# Patient Record
Sex: FEMALE | Race: WHITE | NOT HISPANIC OR LATINO | Employment: FULL TIME | URBAN - METROPOLITAN AREA
[De-identification: names, ages, dates, MRNs, and addresses within clinical notes are randomized per-mention and may not be internally consistent; named-entity substitution may affect disease eponyms.]

---

## 2020-07-01 ENCOUNTER — HOSPITAL ENCOUNTER (OUTPATIENT)
Dept: OTHER | Facility: HOSPITAL | Age: 50
Discharge: HOME | End: 2020-07-01
Attending: SPECIALIST

## 2020-07-20 ENCOUNTER — OFFICE VISIT CONVERTED (OUTPATIENT)
Dept: SURGERY | Facility: CLINIC | Age: 50
End: 2020-07-20
Attending: SURGERY

## 2020-07-23 ENCOUNTER — HOSPITAL ENCOUNTER (OUTPATIENT)
Dept: MRI IMAGING | Facility: HOSPITAL | Age: 50
Discharge: HOME | End: 2020-07-23
Attending: SURGERY

## 2020-08-13 ENCOUNTER — HOSPITAL ENCOUNTER (OUTPATIENT)
Dept: ULTRASOUND IMAGING | Facility: HOSPITAL | Age: 50
Discharge: HOME | End: 2020-08-13
Attending: SURGERY

## 2020-08-24 ENCOUNTER — OFFICE VISIT CONVERTED (OUTPATIENT)
Dept: SURGERY | Facility: CLINIC | Age: 50
End: 2020-08-24
Attending: SURGERY

## 2020-11-16 ENCOUNTER — HOSPITAL ENCOUNTER (OUTPATIENT)
Dept: PREADMISSION TESTING | Facility: HOSPITAL | Age: 50
Discharge: HOME | End: 2020-11-16
Attending: SURGERY

## 2020-11-19 LAB — SARS-COV-2 RNA SPEC QL NAA+PROBE: NOT DETECTED

## 2020-11-20 ENCOUNTER — HOSPITAL ENCOUNTER (OUTPATIENT)
Dept: PERIOP | Facility: HOSPITAL | Age: 50
Setting detail: HOSPITAL OUTPATIENT SURGERY
Discharge: HOME | End: 2020-11-20
Attending: SURGERY

## 2020-12-02 ENCOUNTER — OFFICE VISIT CONVERTED (OUTPATIENT)
Dept: SURGERY | Facility: CLINIC | Age: 50
End: 2020-12-02
Attending: SURGERY

## 2021-05-10 NOTE — H&P
History and Physical      Patient Name: Monisha Mendoza   Patient ID: [id number]   Sex: Female   YOB: 1970    Primary Care Provider: Sage PATEL   Referring Provider: Kristopher Reyes MD    Visit Date: December 2, 2020    Provider: Marija Pickett MD   Location: [facility]   Location Address: [address] [place] [address]  [id number]   Location Phone: [phone]          Chief Complaint  · Follow Up Surgery      History Of Present Illness  Monisha Mendoza is a 50 year old female who is here today for follow up of: excision of right breast lesions retroareolar location.   She is doing well-status post surgery. Pathology was likely papilloma but could not rule out papillary dcis. Will present at breast tumor board deborah.       Past Medical History  Allergic rhinitis, chronic; Anemia, Unspecified; Nipple discharge         Past Surgical History  *Denies any surgical procedures         Allergy List  NO KNOWN DRUG ALLERGIES       Allergies Reconciled  Family Medical History  *No Known Family History         Social History  Tobacco (Never)         Review of Systems  · Constitutional  o Denies  o : fever, chills  · Eyes  o Denies  o : yellowish discoloration of the eyes, eye pain  · HENT  o Denies  o : difficulty swallowing, hoarseness  · Breasts  o * See HPI  · Cardiovascular  o Denies  o : chest pain on exertion, irregular heart beats  · Respiratory  o Denies  o : shortness of breath, cough  · Gastrointestinal  o Denies  o : nausea, vomiting, diarrhea, constipation  · Integument  o Admits  o : see HPI for surgical incision healing  · Neurologic  o Denies  o : tingling or numbness, loss of balance  · Musculoskeletal  o Denies  o : joint pain, back pain  · Endocrine  o Denies  o : weight gain, weight loss  · All Others Negative      Vitals  Date Time BP Position Site L\R Cuff Size HR RR TEMP (F) WT  HT  BMI kg/m2 BSA m2 O2 Sat FR L/min FiO2 HC       12/02/2020 09:19 AM   "    87 - R 16   5'  5\"               Physical Examination  · Constitutional  o Appearance  o : well developed/well nourished patient in no apparent distress  · Respiratory  o Inspection of Chest  o : equal breaths bilaterally  · Gastrointestinal  o Abdominal Examination  o : soft/nontender, nondistended, no organomegaly appreciated  · Post Surgical Incision  o Surgical wound  o : healing well, no erythema          Assessment  · Postoperative Exam Following Surgery     V67.00/Z09      Plan  · Medications  o Medications have been Reconciled  o Transition of Care or Provider Policy  · Instructions  o Return to office with any issues.  o F/U PRN  o Will call patient back after tumor board discussion deborah, at least will need increase high risk screening            Electronically Signed by: Marija Pickett MD -Author on December 2, 2020 09:31:54 AM  "

## 2021-05-10 NOTE — H&P
"   History and Physical      Patient Name: Monisha Mendoza   Patient ID: [id number]   Sex: Female   YOB: 1970    Primary Care Provider: Sage PATEL   Referring Provider: Kristopher Reyes MD    Visit Date: August 24, 2020    Provider: Marija Pickett MD   Location: [facility]   Location Address: [address] [place] [address]  [id number]   Location Phone: [phone]          Chief Complaint  · Right bloody nipple discharge and retroareolar lesions  · Pre-Surgical History and Physical Examination for [facility]  · Consents for Surgery      History Of Present Illness  Monisha Mendoza is a 50 year old female who is referred from Kristopher Reyes MD ; she had bloody nipple discharge and she had an MRI that showed some retroareolar lesions at 3 and 9 oclock a second look ultrasound confirmed these but they do not feel they can biopsy it or localize it and recommended retroareolar surgical excision.           Past Medical History  Allergic rhinitis, chronic; Anemia, Unspecified; Nipple discharge         Past Surgical History  *Denies any surgical procedures         Allergy List  NO KNOWN DRUG ALLERGIES       Allergies Reconciled  Family Medical History  *No Known Family History         Social History  Tobacco (Never)         Review of Systems  · Constitutional  o Denies  o : fever, chills  · Breasts  o * See HPI  · Cardiovascular  o Denies  o : chest pain, cardiac murmurs, irregular heart beats, dyspnea on exertion  · Integument  o Denies  o : rash, itching, new skin lesions  · Heme-Lymph  o Denies  o : easy bleeding, easy bruising, lymph node enlargement or tenderness      Vitals  Date Time BP Position Site L\R Cuff Size HR RR TEMP (F) WT  HT  BMI kg/m2 BSA m2 O2 Sat HC       08/24/2020 08:32 AM      86 - R 16   5'  5\"             Physical Examination  · Constitutional  o Appearance  o : A well-nourished, well-developed patient who ambulates without difficulty, Alert " and Oriented X3.  · Respiratory  o Respiratory Effort  o : breathing unlabored  o Inspection of Chest  o : breaths equal bilaterally  · Breasts  o Inspection of Breasts  o : developmental state normal for age  o Palpation of Breasts, Axillae  o : 3 oclock and 9 oclock at palpable masses located immediately behind the nipple and areola          Assessment  · Breast Mass     611.72/N63  · Preoperative Examination     V72.83      Plan  · Orders  o General Surgery Order (GENOR) - - 08/24/2020  o General Surgery Order (GENOR) - 611.72/N63 - 11/20/2020  o [facility] Pre-Op Covid-19 Screening (92857) - 611.72/N63 - 11/16/2020  · Medications  o Medications have been Reconciled  o Transition of Care or Provider Policy  · Instructions  o PLAN:  o Schedule operation  o Handouts Provided-Pre-Procedure Instructions including date and time and location of procedure.  o ****Surgical Orders****  o ****Patient Status****  o RISK AND BENEFITS:  o Consent for surgery: Given these options, the patient has verbally expressed an understanding of the risks of surgery and finds these risks acceptable. We will proceed with surgery as soon as possible.  o Possible risks, complications, benefits, and alternatives to surgical or invasive procedure have been explained to patient and/or legal guardian.  o O.R. PREP: Per protocol  o IV: Per Anesthesia  o Please sign permit for: Excision of right retroareolar masses x 2  o Kefzol 2 grams IV on call to OR.  o The above History and Physical Examination has been completed within 30 days of admission.            Electronically Signed by: Marija Pickett MD -Author on August 24, 2020 09:15:24 AM

## 2021-05-10 NOTE — H&P
"   History and Physical      Patient Name: Monisha Mendoza   Patient ID: [id number]   Sex: Female   YOB: 1970        Visit Date: July 20, 2020    Provider: Marija Pickett MD   Location: [facility]   Location Address: [address] [place] [address]  [id number]   Location Phone: [phone]          Chief Complaint  · Discharge from multiple ducts on right breast, clear and bloody      History Of Present Illness  Monisha Mendoza is a 50 year old female who is referred from Kristopher Reyes MD ; Very pleasant lady with discharge from multiple ducts on right nipple, one with bloody discharge and several with clear, there was one also with a more physiological color. She had a mammogram that showed duct ectasaia in right breast and ductal debris. A ductogram would not be appropriate given number of ducts involved.             Past Medical History  Allergic rhinitis, chronic; Anemia, Unspecified; Nipple discharge         Past Surgical History  *Denies any surgical procedures         Allergy List  NO KNOWN DRUG ALLERGIES       Allergies Reconciled  Family Medical History  *No Known Family History         Social History  Tobacco (Never)         Review of Systems  · Constitutional  o Denies  o : fever, chills  · Breasts  o * See HPI  · Cardiovascular  o Denies  o : chest pain, cardiac murmurs, irregular heart beats, dyspnea on exertion  · Integument  o Denies  o : rash, itching, new skin lesions  · Heme-Lymph  o Denies  o : easy bleeding, easy bruising, lymph node enlargement or tenderness      Vitals  Date Time BP Position Site L\R Cuff Size HR RR TEMP (F) WT  HT  BMI kg/m2 BSA m2 O2 Sat        07/20/2020 10:55 AM      90 - R 20   5'  5\"             Physical Examination  · Constitutional  o Appearance  o : A well-nourished, well-developed patient who ambulates without difficulty, Alert and Oriented X3.  · Respiratory  o Respiratory Effort  o : breathing unlabored  o Inspection of Chest  o : " breaths equal bilaterally  · Breasts  o Inspection of Breasts  o : developmental state normal for age  o Palpation of Breasts, Axillae  o : no masses or tenderness noted on palpation, ducts on right nipple with bloody discharge from one and several with clear discharge and one duct with a more physiologic green color.          Assessment  · Nipple discharge, bloody     611.79/N64.52      Plan  · Orders  o MRI Breast Bilateral WITH and WITHOUT Contrast [facility] (03578) - 611.79/N64.52 - 07/23/2020  · Medications  o Medications have been Reconciled  o Transition of Care or Provider Policy  · Instructions  o PLAN:  o Will check breast mri given number of right breast ducts involved            Electronically Signed by: Marija Pickett MD -Author on July 20, 2020 11:09:24 AM

## 2021-05-14 VITALS — RESPIRATION RATE: 16 BRPM | HEIGHT: 65 IN | HEART RATE: 86 BPM

## 2021-05-14 VITALS — HEIGHT: 65 IN | RESPIRATION RATE: 16 BRPM | HEART RATE: 87 BPM

## 2021-05-15 VITALS — HEIGHT: 65 IN | HEART RATE: 90 BPM | RESPIRATION RATE: 20 BRPM

## 2021-06-04 ENCOUNTER — TRANSCRIBE ORDERS (OUTPATIENT)
Dept: ADMINISTRATIVE | Facility: HOSPITAL | Age: 51
End: 2021-06-04

## 2021-06-04 DIAGNOSIS — D05.80 SOLID PAPILLARY CARCINOMA IN SITU OF BREAST: Primary | ICD-10-CM

## 2021-06-22 ENCOUNTER — HOSPITAL ENCOUNTER (OUTPATIENT)
Dept: MRI IMAGING | Facility: HOSPITAL | Age: 51
Discharge: HOME | End: 2021-06-22
Admitting: SURGERY

## 2021-06-22 DIAGNOSIS — D05.80 SOLID PAPILLARY CARCINOMA IN SITU OF BREAST: ICD-10-CM

## 2021-06-22 PROCEDURE — 77049 MRI BREAST C-+ W/CAD BI: CPT

## 2021-06-22 PROCEDURE — 77049 MRI BREAST C-+ W/CAD BI: CPT | Performed by: RADIOLOGY

## 2021-06-22 PROCEDURE — A9585 GADOBUTROL INJECTION: HCPCS | Performed by: SURGERY

## 2021-06-22 PROCEDURE — 0 GADOBUTROL 1 MMOL/ML SOLUTION: Performed by: SURGERY

## 2021-06-22 RX ADMIN — GADOBUTROL 10 ML: 604.72 INJECTION INTRAVENOUS at 10:49

## 2021-06-30 ENCOUNTER — OFFICE VISIT (OUTPATIENT)
Dept: SURGERY | Facility: CLINIC | Age: 51
End: 2021-06-30

## 2021-06-30 VITALS — HEIGHT: 66 IN | RESPIRATION RATE: 14 BRPM | WEIGHT: 170 LBS | BODY MASS INDEX: 27.32 KG/M2

## 2021-06-30 DIAGNOSIS — Z12.39 BREAST SCREENING: Primary | ICD-10-CM

## 2021-06-30 PROBLEM — D64.9 ANEMIA: Status: ACTIVE | Noted: 2021-06-30

## 2021-06-30 PROBLEM — N64.52 NIPPLE DISCHARGE: Status: ACTIVE | Noted: 2021-06-30

## 2021-06-30 PROCEDURE — 99213 OFFICE O/P EST LOW 20 MIN: CPT | Performed by: SURGERY

## 2021-06-30 NOTE — PROGRESS NOTES
Chief Complaint   Patient presents with   • Follow-up     MRI       Monisha Mendoza is an 51 y.o. 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 She is due for her bilateral milligrams in July of this year and a bilateral MRI in 1 year.    MRI Findings:    INDICATIONS:  FOLLOW-UP EXAM. H/O RIGHT BREAST EXCISIONAL BIOPSY NOV. 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     CONTRAST:      10ML  Gadavist I.V.     TECHNIQUE:    Breast MRI was performed using dynamic intravenous gadolinium infusion, thin sections,   and a dedicated breast coil.  Contrast enhancement analysis was assisted by computer-aided   detection.       AMOUNT OF FIBROGLANDULAR TISSUE:        Heterogeneous fibroglandular tissue        BACKGROUND PARENCHYMAL ENHANCEMENT:       Mild symmetric     FINDINGS:  Multifocal ductal enhancement in the nipples suggests a benign etiology.     Contrast bolus appears adequate.     No abnormality of the chest wall is evident.  No axillary or internal mammary adenopathy is seen.           CONTINUED ON NEXT PAGE...        IMPRESSION:  MR examination of the breast demonstrating multifocal ductal enhancement in the nipples   bilaterally, suggesting a benign etiology.  No MR findings of malignancy are evident.     Bilateral screening MR examination of the breast is recommended in a year.     The patient should have a bilateral mammogram in 7/21. This will be 1 year from the previous   bilateral mammogram.     BIRADS:  DIAGNOSTIC CATEGORY 2--BENIGN FINDING.    Past Medical History:   Diagnosis Date   • Anemia, unspecified    • Chronic allergic rhinitis    • Nipple discharge      History reviewed. No pertinent surgical history.  Family History   Family history unknown: Yes     Social History     Tobacco Use  "  • Smoking status: Current Every Day Smoker     Types: Cigarettes   Substance Use Topics   • Alcohol use: Not on file   • Drug use: Not on file         Review of Systems   Constitutional: Negative for fatigue and fever.   HENT: Negative for sore throat and trouble swallowing.    Eyes: Negative for pain and visual disturbance.   Respiratory: Negative for cough and shortness of breath.    Cardiovascular: Negative for chest pain and leg swelling.   Gastrointestinal: Negative for abdominal pain and vomiting.   Endocrine: Negative for cold intolerance and heat intolerance.   Musculoskeletal: Negative for arthralgias and joint swelling.   Skin: Negative for rash and wound.   Neurological: Negative for speech difficulty and numbness.   Psychiatric/Behavioral: Negative for confusion. The patient is not nervous/anxious.        Vitals:    06/30/21 0842   Resp: 14   Weight: 77.1 kg (170 lb)   Height: 167.6 cm (66\")       Physical Exam  Vitals and nursing note reviewed.   Constitutional:       General: She is not in acute distress.     Appearance: Normal appearance. She is well-developed.   HENT:      Head: Normocephalic and atraumatic.   Eyes:      Extraocular Movements: Extraocular movements intact.      Pupils: Pupils are equal, round, and reactive to light.   Cardiovascular:      Pulses: Normal pulses.   Pulmonary:      Effort: Pulmonary effort is normal. No retractions.      Breath sounds: Normal air entry. No wheezing.   Chest:      Breasts:         Right: No mass or nipple discharge.         Left: No mass or nipple discharge.   Abdominal:      General: Bowel sounds are normal. There is no distension.      Palpations: Abdomen is soft.      Tenderness: There is no abdominal tenderness.      Hernia: No hernia is present.   Musculoskeletal:         General: No swelling or deformity.      Cervical back: Neck supple.   Lymphadenopathy:      Upper Body:      Right upper body: No axillary adenopathy.      Left upper body: No " axillary adenopathy.   Skin:     General: Skin is warm and dry.      Findings: No erythema.      Comments: Surgical Incision Healing Well   Neurological:      General: No focal deficit present.      Mental Status: She is alert and oriented to person, place, and time.      Motor: Motor function is intact.   Psychiatric:         Mood and Affect: Mood normal.         Thought Content: Thought content normal.             Diagnoses and all orders for this visit:    1. Breast screening (Primary)  Assessment & Plan:  Patient need to have a bilateral mammogram in July 2021 this puts her 1 year from prior mammogram she also need to have a bilateral screening MRI in 1 year    Orders:  -     Mammo Screening Bilateral With CAD; Future  -     MRI Breast Bilateral Screening With & Without Contrast; Future

## 2021-06-30 NOTE — ASSESSMENT & PLAN NOTE
Patient need to have a bilateral mammogram in July 2021 this puts her 1 year from prior mammogram she also need to have a bilateral screening MRI in 1 year

## 2021-07-19 ENCOUNTER — HOSPITAL ENCOUNTER (OUTPATIENT)
Dept: MAMMOGRAPHY | Facility: HOSPITAL | Age: 51
Discharge: HOME | End: 2021-07-19
Admitting: SURGERY

## 2021-07-19 DIAGNOSIS — Z12.39 BREAST SCREENING: ICD-10-CM

## 2021-07-19 PROCEDURE — 77067 SCR MAMMO BI INCL CAD: CPT

## 2021-07-19 PROCEDURE — 77063 BREAST TOMOSYNTHESIS BI: CPT

## 2022-06-30 ENCOUNTER — HOSPITAL ENCOUNTER (OUTPATIENT)
Dept: MRI IMAGING | Facility: HOSPITAL | Age: 52
Discharge: HOME | End: 2022-06-30
Admitting: SURGERY

## 2022-06-30 DIAGNOSIS — Z12.39 BREAST SCREENING: ICD-10-CM

## 2022-06-30 PROCEDURE — 0 GADOBENATE DIMEGLUMINE 529 MG/ML SOLUTION: Performed by: SURGERY

## 2022-06-30 PROCEDURE — A9577 INJ MULTIHANCE: HCPCS | Performed by: SURGERY

## 2022-06-30 PROCEDURE — 77049 MRI BREAST C-+ W/CAD BI: CPT

## 2022-06-30 RX ADMIN — GADOBENATE DIMEGLUMINE 15 ML: 529 INJECTION, SOLUTION INTRAVENOUS at 10:36

## 2022-07-05 DIAGNOSIS — Z12.39 BREAST SCREENING: Primary | ICD-10-CM

## 2022-07-25 ENCOUNTER — TELEPHONE (OUTPATIENT)
Dept: SURGERY | Facility: CLINIC | Age: 52
End: 2022-07-25

## 2022-07-25 NOTE — TELEPHONE ENCOUNTER
Caller: Monisha Mendoza    Relationship to patient: Self    Best call back number: [phone]      Type of visit: FOLLOW UP      Additional notes:PT CANCELLED HER APPT TODAY W/ DR LEACH. SHE ADVISED DR LEACH TOLD HER SHE DID NOT NEED TO BE SEEN TODAY AND THAT ANOTHER MRI WOULD BE ORDERED.    IF NEEDED, PT CAN BE REACHED ANYTIME; OKAY TO LEAVE MESSAGE IF NO ANSWER.

## 2022-08-04 ENCOUNTER — TRANSCRIBE ORDERS (OUTPATIENT)
Dept: ADMINISTRATIVE | Facility: HOSPITAL | Age: 52
End: 2022-08-04

## 2022-08-04 DIAGNOSIS — Z12.39 SCREENING BREAST EXAMINATION: Primary | ICD-10-CM

## 2022-09-08 ENCOUNTER — HOSPITAL ENCOUNTER (OUTPATIENT)
Dept: MAMMOGRAPHY | Facility: HOSPITAL | Age: 52
Discharge: HOME | End: 2022-09-08
Admitting: SURGERY

## 2022-09-08 DIAGNOSIS — Z12.39 SCREENING BREAST EXAMINATION: ICD-10-CM

## 2022-09-08 PROCEDURE — 77067 SCR MAMMO BI INCL CAD: CPT

## 2022-09-08 PROCEDURE — 77063 BREAST TOMOSYNTHESIS BI: CPT

## 2023-11-14 ENCOUNTER — TELEPHONE (OUTPATIENT)
Dept: SURGERY | Facility: CLINIC | Age: 53
End: 2023-11-14
Payer: COMMERCIAL

## 2023-11-14 NOTE — TELEPHONE ENCOUNTER
PATIENT CALLED AND SAID SHE RESCHEDULED HER ANNUAL BREAST MRI, HERSELF, RATHER THAN HAVING THE OFFICE RESCHEDULE IT.      SHE CALLED AND CANCELLED IT.  SHE SAID HER COPAY WAS GOING TO BE $756.00, WHERE IT USUALLY IS $400.  SHE SAID SHE DIDN'T KNOW IF IT WAS PREAUTHORIZED, SINCE SHE RESCHEDULED IT HERSELF.  I TOLD HER WE HAVE NO CONTROL OVER HER COPAY AND SHE WOULD NEED TO CONTACT INSURANCE REGARDING THIS.    I TOLD HER THAT  FROM WHAT I CAN SEE, IT LOOKS LIKE IT WAS AUTHORIZED UNTIL 11/22/23.      SHE WANTS OUR OFFICE TO CALL AND RESCHEDULE IT FOR HER.

## 2023-12-01 DIAGNOSIS — Z12.39 ENCOUNTER FOR SCREENING FOR MALIGNANT NEOPLASM OF BREAST, UNSPECIFIED SCREENING MODALITY: Primary | ICD-10-CM

## 2024-01-02 ENCOUNTER — HOSPITAL ENCOUNTER (OUTPATIENT)
Dept: MRI IMAGING | Facility: HOSPITAL | Age: 54
Discharge: HOME | End: 2024-01-02
Admitting: SURGERY
Payer: COMMERCIAL

## 2024-01-02 DIAGNOSIS — Z12.39 ENCOUNTER FOR SCREENING FOR MALIGNANT NEOPLASM OF BREAST, UNSPECIFIED SCREENING MODALITY: ICD-10-CM

## 2024-01-02 PROCEDURE — A9577 INJ MULTIHANCE: HCPCS | Performed by: SURGERY

## 2024-01-02 PROCEDURE — 0 GADOBENATE DIMEGLUMINE 529 MG/ML SOLUTION: Performed by: SURGERY

## 2024-01-02 RX ADMIN — GADOBENATE DIMEGLUMINE 15 ML: 529 INJECTION, SOLUTION INTRAVENOUS at 13:00

## 2024-02-09 ENCOUNTER — HOSPITAL ENCOUNTER (OUTPATIENT)
Dept: MRI IMAGING | Facility: HOSPITAL | Age: 54
Discharge: HOME | End: 2024-02-09
Admitting: SURGERY
Payer: COMMERCIAL

## 2024-02-09 PROCEDURE — 77049 MRI BREAST C-+ W/CAD BI: CPT

## 2024-02-09 PROCEDURE — A9577 INJ MULTIHANCE: HCPCS | Performed by: SURGERY

## 2024-02-09 PROCEDURE — 0 GADOBENATE DIMEGLUMINE 529 MG/ML SOLUTION: Performed by: SURGERY

## 2024-02-09 RX ADMIN — GADOBENATE DIMEGLUMINE 15 ML: 529 INJECTION, SOLUTION INTRAVENOUS at 13:51

## 2024-02-21 DIAGNOSIS — Z12.39 BREAST SCREENING: Primary | ICD-10-CM

## 2024-02-21 DIAGNOSIS — Z91.89 AT HIGH RISK FOR BREAST CANCER: Primary | ICD-10-CM

## 2024-03-01 ENCOUNTER — HOSPITAL ENCOUNTER (OUTPATIENT)
Dept: MAMMOGRAPHY | Facility: HOSPITAL | Age: 54
Discharge: HOME | End: 2024-03-01
Admitting: SURGERY
Payer: COMMERCIAL

## 2024-03-01 DIAGNOSIS — Z12.39 BREAST SCREENING: ICD-10-CM

## 2024-03-01 DIAGNOSIS — Z91.89 AT HIGH RISK FOR BREAST CANCER: Primary | ICD-10-CM

## 2024-03-01 PROCEDURE — 77063 BREAST TOMOSYNTHESIS BI: CPT

## 2024-03-01 PROCEDURE — 77067 SCR MAMMO BI INCL CAD: CPT

## 2024-03-06 DIAGNOSIS — Z91.89 AT HIGH RISK FOR BREAST CANCER: Primary | ICD-10-CM

## 2024-03-15 ENCOUNTER — HOSPITAL ENCOUNTER (OUTPATIENT)
Dept: MAMMOGRAPHY | Facility: HOSPITAL | Age: 54
Discharge: HOME | End: 2024-03-15
Payer: COMMERCIAL

## 2024-03-15 ENCOUNTER — HOSPITAL ENCOUNTER (OUTPATIENT)
Dept: ULTRASOUND IMAGING | Facility: HOSPITAL | Age: 54
Discharge: HOME | End: 2024-03-15
Payer: COMMERCIAL

## 2024-03-15 DIAGNOSIS — Z91.89 AT HIGH RISK FOR BREAST CANCER: ICD-10-CM

## 2024-03-15 PROCEDURE — 76642 ULTRASOUND BREAST LIMITED: CPT

## 2024-03-15 PROCEDURE — G0279 TOMOSYNTHESIS, MAMMO: HCPCS

## 2024-03-15 PROCEDURE — 77065 DX MAMMO INCL CAD UNI: CPT

## 2025-02-04 DIAGNOSIS — Z91.89 AT HIGH RISK FOR BREAST CANCER: Primary | ICD-10-CM

## 2025-05-02 ENCOUNTER — TRANSCRIBE ORDERS (OUTPATIENT)
Dept: MAMMOGRAPHY | Facility: HOSPITAL | Age: 55
End: 2025-05-02
Payer: COMMERCIAL

## 2025-05-02 DIAGNOSIS — Z12.31 SCREENING MAMMOGRAM FOR BREAST CANCER: Primary | ICD-10-CM

## 2025-05-02 DIAGNOSIS — Z13.820 OSTEOPOROSIS SCREENING: Primary | ICD-10-CM

## 2025-07-16 ENCOUNTER — HOSPITAL ENCOUNTER (OUTPATIENT)
Dept: MAMMOGRAPHY | Facility: HOSPITAL | Age: 55
Discharge: HOME | End: 2025-07-16
Payer: COMMERCIAL

## 2025-07-16 ENCOUNTER — HOSPITAL ENCOUNTER (OUTPATIENT)
Dept: BONE DENSITY | Facility: HOSPITAL | Age: 55
Discharge: HOME | End: 2025-07-16
Payer: COMMERCIAL

## 2025-07-16 DIAGNOSIS — Z12.31 SCREENING MAMMOGRAM FOR BREAST CANCER: ICD-10-CM

## 2025-07-16 DIAGNOSIS — Z13.820 OSTEOPOROSIS SCREENING: ICD-10-CM

## 2025-07-16 PROCEDURE — 77063 BREAST TOMOSYNTHESIS BI: CPT

## 2025-07-16 PROCEDURE — 77067 SCR MAMMO BI INCL CAD: CPT

## 2025-07-16 PROCEDURE — 77080 DXA BONE DENSITY AXIAL: CPT
